# Patient Record
Sex: MALE | Race: WHITE | NOT HISPANIC OR LATINO
[De-identification: names, ages, dates, MRNs, and addresses within clinical notes are randomized per-mention and may not be internally consistent; named-entity substitution may affect disease eponyms.]

---

## 2021-08-02 ENCOUNTER — NON-APPOINTMENT (OUTPATIENT)
Age: 31
End: 2021-08-02

## 2021-08-02 PROBLEM — Z00.00 ENCOUNTER FOR PREVENTIVE HEALTH EXAMINATION: Status: ACTIVE | Noted: 2021-08-02

## 2021-08-03 ENCOUNTER — APPOINTMENT (OUTPATIENT)
Dept: OTOLARYNGOLOGY | Facility: CLINIC | Age: 31
End: 2021-08-03
Payer: COMMERCIAL

## 2021-08-03 VITALS — WEIGHT: 135 LBS | HEIGHT: 71 IN | TEMPERATURE: 97.1 F | BODY MASS INDEX: 18.9 KG/M2

## 2021-08-03 DIAGNOSIS — H61.22 IMPACTED CERUMEN, LEFT EAR: ICD-10-CM

## 2021-08-03 DIAGNOSIS — H93.299 OTHER ABNORMAL AUDITORY PERCEPTIONS, UNSPECIFIED EAR: ICD-10-CM

## 2021-08-03 PROCEDURE — G0268 REMOVAL OF IMPACTED WAX MD: CPT

## 2021-08-03 PROCEDURE — 92557 COMPREHENSIVE HEARING TEST: CPT

## 2021-08-03 PROCEDURE — 92567 TYMPANOMETRY: CPT

## 2021-08-03 PROCEDURE — 99204 OFFICE O/P NEW MOD 45 MIN: CPT | Mod: 25

## 2021-08-03 RX ORDER — FINASTERIDE 1 MG/1
1 TABLET ORAL
Qty: 90 | Refills: 0 | Status: ACTIVE | COMMUNITY
Start: 2021-03-08

## 2021-08-03 NOTE — REVIEW OF SYSTEMS
[Ear Pain] : ear pain [Vertigo] : vertigo [Negative] : Heme/Lymph [Patient Intake Form Reviewed] : Patient intake form was reviewed [de-identified] : ear pressure 2017

## 2021-08-03 NOTE — CONSULT LETTER
[Dear  ___] : Dear  [unfilled], [Consult Letter:] : I had the pleasure of evaluating your patient, [unfilled]. [Please see my note below.] : Please see my note below. [Consult Closing:] : Thank you very much for allowing me to participate in the care of this patient.  If you have any questions, please do not hesitate to contact me. [Sincerely,] : Sincerely, [FreeTextEntry3] : Adira Anglin MD\par
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
## 2021-08-03 NOTE — ASSESSMENT
[FreeTextEntry1] : LEORA CORREA felt better after hair and cerumen removal. Audiogram is normal. \par I think he has residual imbalance from BPPV. I suggest observation. He will call me if not resolved.

## 2021-08-06 ENCOUNTER — APPOINTMENT (OUTPATIENT)
Dept: OTOLARYNGOLOGY | Facility: CLINIC | Age: 31
End: 2021-08-06
Payer: COMMERCIAL

## 2021-08-06 DIAGNOSIS — H81.11 BENIGN PAROXYSMAL VERTIGO, RIGHT EAR: ICD-10-CM

## 2021-08-06 DIAGNOSIS — R42 DIZZINESS AND GIDDINESS: ICD-10-CM

## 2021-08-06 PROCEDURE — 95992 CANALITH REPOSITIONING PROC: CPT

## 2021-08-06 PROCEDURE — 99213 OFFICE O/P EST LOW 20 MIN: CPT | Mod: 25
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
## 2021-08-06 NOTE — ASSESSMENT
[FreeTextEntry1] : He had recurrent vertigo consistent with right benign positional vertigo. [place]-Hallpike testing was performed and was positive. Epley maneuver was performed\par \par PLAN\par \par -findings and management options discussed in detail with the patient. \par -good aural hygiene\par -monitor hearing\par -low salt diet and avoidance of caffeine, alcohol and nicotine in case he has Meniere's disease ( his symptoms are consistent with BPPV but he did have symptoms that lasted for a few hours)\par -meclizine prn severe dizziness\par -ABR, VNG, Ecochg to evaluate for peripheral vestibulopathy if he has recurrent dizziness\par -neurology evaluation and MRI with/without contrast to rule out retrocochlear pathology if he has persistent symptoms\par -he was given post-Epley instructions\par -I am sending him for vestibular therapy. He may also try home exercises\par -I asked him to call me next week and let me know if the dizziness has resolved. If it has not, we will proceed with further workup\par -call and return earlier if any concerns. \par

## 2024-02-05 ENCOUNTER — APPOINTMENT (OUTPATIENT)
Dept: OTOLARYNGOLOGY | Facility: CLINIC | Age: 34
End: 2024-02-05
Payer: COMMERCIAL

## 2024-02-05 DIAGNOSIS — J34.2 DEVIATED NASAL SEPTUM: ICD-10-CM

## 2024-02-05 DIAGNOSIS — R09.81 NASAL CONGESTION: ICD-10-CM

## 2024-02-05 DIAGNOSIS — J00 ACUTE NASOPHARYNGITIS [COMMON COLD]: ICD-10-CM

## 2024-02-05 PROCEDURE — 99204 OFFICE O/P NEW MOD 45 MIN: CPT | Mod: 25

## 2024-02-05 PROCEDURE — 31231 NASAL ENDOSCOPY DX: CPT

## 2024-02-05 RX ORDER — PREDNISONE 10 MG/1
10 TABLET ORAL
Qty: 30 | Refills: 0 | Status: ACTIVE | COMMUNITY
Start: 2024-02-05 | End: 1900-01-01

## 2024-02-05 NOTE — HISTORY OF PRESENT ILLNESS
[de-identified] : CC: sinus congestion and ear pressure   HISTORY OF PRESENT ILLNESS:  Truman Sanchez is a 32 y/o male w/ PMH of vertigo presents today w/ c/o sinus congestion and ear pressure x1 mo. the pressure is mostly in the L ear  and his top back molars are painful he expresses extreme pressure behind the eyes and cheeks as well as ear pain mostly in the L ear he took afrin which provided minimal relief he has a business trip next week and is requesting relief denies fevers  REVIEW OF SYSTEMS: General ROS: negative for - chills, fatigue or fever Psychological ROS: negative for - anxiety or depression Ophthalmic ROS: negative for - blurry vision, decreased vision or double vision ENT ROS: negative except as noted from HPI Allergy and Immunology ROS: negative except as noted from HPI Hematological and Lymphatic ROS: negative for - bleeding problems Endocrine ROS: negative for - malaise/lethargy Respiratory ROS: negative for - stridor Cardiovascular ROS: negative for - chest pain Gastrointestinal ROS: negative for - appetite loss or nausea/vomiting Genitourinary ROS: negative for - incontinence Musculoskeletal ROS: negative for - gait disturbance Neurological ROS: negative for - behavioral changes Dermatological ROS: negative for - nail changes  Physical Exam:  GENERAL APPEARANCE: Well-developed and No Acute Distress. COMMUNICATION: Able to Communicate. Strong Voice.  HEAD AND FACE Eyes: Testing of ocular motility including primary gaze alignment normal. Inspection and Appearance: No evidence of lesions or masses Palpation: Palpation of the face reveals no sinus tenderness Salivary Glands: Symmetric without masses Facial Strength: Symmetric without evidence of facial paralysis  EAR, NOSE, MOUTH, and THROAT: Ear Canals and Tympanic Membranes, Bilateral: No evidence of inflammation or lesions. Thresholds: Clinical speech reception thresholds normal. External, Nose and Auricle: No lesions or masses. Nasal, Mucosa, Septum, and Turbinates: See endoscopy.  NECK: Evaluation: No evidence of masses or crepitus. The neck is symmetric and the trachea is in the midline position. Thyroid: No evidence of enlargement, tenderness or mass. Neck Lymph Nodes: WNL. Respiratory: Inspection of the chest including symmetry, expansion and/or assessment of respiratory effort normal. Cardiovascular: Evaluation of peripheral vascular system by observation and palpation of capillary refill, normal. Neurological/Psychiatric: Alert, Oriented, Mood, and Affect Normal.  IMAGING:  PROCEDURE: Nasal endoscopy ([id number])  SURGEON: John Mendoza MD  Prior to the procedure, I had a discussion with the patient regarding the risks, benefits, and alternatives of the procedure and a verbal consent was obtained.  After obtaining adequate decongestion of the nasal mucosa with topical Epinephrine and adequate anesthesia with topical Lidocaine nasal spray, the nasal endoscope was used to examine the nasal passages and paranasal sinuses. The endoscope was passed along the floor of the nose bilaterally to evaluate the inferior meatus, floor of the nose, inferior turbinate, and nasopharynx. The scope was then passed superiorly to evaluate the area of the sphenoethmoidal recess, superior turbinate and superior meatus. As the scope was withdrawn anteriorly, the middle turbinate and middle meatus were carefully inspected. The endoscope was withdrawn and the patient tolerated the procedure well. No complications were encountered.  INSTRUMENTS: rigid 45  EXAM FINDINGS: septal caudal deviation to the right, posteriorly it's more to the left. diffuse edema, thin clear secretions. no polysp or mucopurulence  IMPRESSION: Truman Sanchez is a 32 y/o male w/ PMH of vertigo presents today w/ c/o sinus congestion and ear pressure x1 mo.  PLAN: - flonase or nasocort BID for which he has already, trial of prednisone 40 taper -if symptoms persist he will call back and reassess  John Mendoza MD [facility] Department of Otolaryngology- Head and Neck Surgery [facility]